# Patient Record
(demographics unavailable — no encounter records)

---

## 2025-07-17 NOTE — HISTORY OF PRESENT ILLNESS
[FreeTextEntry1] : 35 yo male with discomfort of prostate. States when he sits he feels discomfort in pelvis. States he had trouble voiding with sensation of incomplete emptying.  Says this happened previously in 2019 and received bactrim which helped He was treated again with bactrim  States he had some constipation 3 days ago.

## 2025-07-17 NOTE — HISTORY OF PRESENT ILLNESS
[FreeTextEntry1] : 37 yo male with discomfort of prostate. States when he sits he feels discomfort in pelvis. States he had trouble voiding with sensation of incomplete emptying.  Says this happened previously in 2019 and received bactrim which helped He was treated again with bactrim  States he had some constipation 3 days ago.

## 2025-07-17 NOTE — ASSESSMENT
[FreeTextEntry1] : 37 yo male with pelvic floor tension and prostatitis.  Will get a bladder ultrasound for prostate volume.  Discussed behavioral modifications.  Encouraged increased hydration avoiding coffee soda tea.  Will refer to pelvic floor physical therapy.  Plan Urinalysis Urine culture Bladder us to assess for prostate abnormalities  Increased hydration Stool softeners Pelvic floor PT FU after imaging to review   Patient is being seen today for evaluation and management of a chronic and longitudinal ongoing condition and I am of the primary treating physician.